# Patient Record
Sex: MALE | Race: WHITE | URBAN - METROPOLITAN AREA
[De-identification: names, ages, dates, MRNs, and addresses within clinical notes are randomized per-mention and may not be internally consistent; named-entity substitution may affect disease eponyms.]

---

## 2019-01-17 ENCOUNTER — OFFICE VISIT (OUTPATIENT)
Dept: URBAN - METROPOLITAN AREA CLINIC 48 | Facility: CLINIC | Age: 79
End: 2019-01-17
Payer: MEDICARE

## 2019-01-17 PROCEDURE — 92014 COMPRE OPH EXAM EST PT 1/>: CPT | Performed by: OPHTHALMOLOGY

## 2019-01-17 PROCEDURE — 92134 CPTRZ OPH DX IMG PST SGM RTA: CPT | Performed by: OPHTHALMOLOGY

## 2019-01-17 PROCEDURE — 76514 ECHO EXAM OF EYE THICKNESS: CPT | Performed by: OPHTHALMOLOGY

## 2019-01-17 RX ORDER — TIMOLOL MALEATE 5 MG/ML
0.5 % SOLUTION OPHTHALMIC
Qty: 1 | Refills: 6 | Status: INACTIVE
Start: 2019-01-17 | End: 2019-04-10

## 2019-01-17 ASSESSMENT — INTRAOCULAR PRESSURE
OD: 12
OS: 14

## 2019-01-17 NOTE — IMPRESSION/PLAN
Impression: Cystoid macular degeneration, left eye: H35.352. 
Patient has had for a long time, has been treated before with Dr. Shanta Perez: Teri Escalante next available

## 2019-03-07 ENCOUNTER — OFFICE VISIT (OUTPATIENT)
Dept: URBAN - METROPOLITAN AREA CLINIC 48 | Facility: CLINIC | Age: 79
End: 2019-03-07
Payer: MEDICARE

## 2019-03-07 DIAGNOSIS — H34.8322 TRIBUTARY (BRANCH) RETINAL VEIN OCCLUSION, LEFT EYE, STABLE: ICD-10-CM

## 2019-03-07 PROCEDURE — 92134 CPTRZ OPH DX IMG PST SGM RTA: CPT | Performed by: OPHTHALMOLOGY

## 2019-03-07 PROCEDURE — 99214 OFFICE O/P EST MOD 30 MIN: CPT | Performed by: OPHTHALMOLOGY

## 2019-03-07 ASSESSMENT — INTRAOCULAR PRESSURE
OS: 14
OD: 12

## 2019-03-07 NOTE — IMPRESSION/PLAN
Impression: Puckering of macula, bilateral: H35.373. Plan: OD: mild ERM; Observe. OS: Hx of PPV, MP in 2013 w/Dr. Elvis Paez. OCT today w/post surgical changes, stable since 2016.  
F/u 1 yr DFE/OCT or sooner PRN

## 2020-10-08 NOTE — IMPRESSION/PLAN
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 Plan: Hx of BRVO OS. No treatment req'd. Will cont to observe. Discussed importance of BP, lipid control. Detail Level: Zone Other (Free Text): Gave epiduo forte apply QD for five days patient will return to clinic in 3 weeks for re-evaluation, if persistent will discuss hyfrecator removal. Note Text (......Xxx Chief Complaint.): This diagnosis correlates with the

## 2021-11-30 ENCOUNTER — OFFICE VISIT (OUTPATIENT)
Dept: URBAN - METROPOLITAN AREA CLINIC 48 | Facility: CLINIC | Age: 81
End: 2021-11-30
Payer: MEDICARE

## 2021-11-30 PROCEDURE — 92134 CPTRZ OPH DX IMG PST SGM RTA: CPT | Performed by: OPHTHALMOLOGY

## 2021-11-30 PROCEDURE — 92014 COMPRE OPH EXAM EST PT 1/>: CPT | Performed by: OPHTHALMOLOGY

## 2021-11-30 RX ORDER — DORZOLAMIDE HCL 20 MG/ML
2 % SOLUTION/ DROPS OPHTHALMIC
Qty: 5 | Refills: 3 | Status: INACTIVE
Start: 2021-11-30 | End: 2022-03-15

## 2021-11-30 ASSESSMENT — INTRAOCULAR PRESSURE
OD: 10
OS: 10

## 2021-11-30 NOTE — IMPRESSION/PLAN
Impression: Cystoid macular degeneration, left eye: H35.352. Patient daughter has history of RP Plan: Discussed diagnosis with patient, may be component of RP. Dorzolamide used for cyst in retina, may be mechanism of RP. Patient to start Dorzolomide tid OS 
continue latanoprost qhs OU
D/C timolol RTC 8-9 weeks DE/OCT Tallula Fass

## 2022-01-25 ENCOUNTER — OFFICE VISIT (OUTPATIENT)
Dept: URBAN - METROPOLITAN AREA CLINIC 48 | Facility: CLINIC | Age: 82
End: 2022-01-25
Payer: MEDICARE

## 2022-01-25 PROCEDURE — 92014 COMPRE OPH EXAM EST PT 1/>: CPT | Performed by: OPHTHALMOLOGY

## 2022-01-25 PROCEDURE — 92134 CPTRZ OPH DX IMG PST SGM RTA: CPT | Performed by: OPHTHALMOLOGY

## 2022-01-25 RX ORDER — NEPAFENAC 3 MG/ML
0.3 % SUSPENSION/ DROPS OPHTHALMIC
Qty: 5 | Refills: 2 | Status: INACTIVE
Start: 2022-01-25 | End: 2022-01-25

## 2022-01-25 ASSESSMENT — INTRAOCULAR PRESSURE
OS: 10
OD: 10

## 2022-01-25 NOTE — IMPRESSION/PLAN
Impression: Primary open-angle glaucoma, left eye, moderate stage: V44.9369. Plan: IOP is stable at this time, continue current regimen. Patient to continue:
 Dorzolamide tid OS  Latanoprost qhs OU

## 2022-01-25 NOTE — IMPRESSION/PLAN
Impression: Puckering of macula, bilateral: H35.373. Plan: ERM will consider consult if vision worsens Continue to monitor

## 2022-01-25 NOTE — IMPRESSION/PLAN
Impression: Cystoid macular degeneration, left eye: H35.352. Patient daughter has history of RP Plan: Discussed diagnosis with patient, may be component of RP. Dorzolamide used for cyst in retina, may be mechanism of RP. Amsler grid screening discussed. If patient notices changes in  Amsler grid, patient to call sooner to be seen. 

 Patient to start Ilevro qd OS


RTC 2 weeks DE/OCT Bob Rios

## 2022-02-23 ENCOUNTER — OFFICE VISIT (OUTPATIENT)
Dept: URBAN - METROPOLITAN AREA CLINIC 48 | Facility: CLINIC | Age: 82
End: 2022-02-23
Payer: MEDICARE

## 2022-02-23 DIAGNOSIS — H35.373 PUCKERING OF MACULA, BILATERAL: ICD-10-CM

## 2022-02-23 DIAGNOSIS — H35.352 CYSTOID MACULAR DEGENERATION, LEFT EYE: ICD-10-CM

## 2022-02-23 PROCEDURE — 92014 COMPRE OPH EXAM EST PT 1/>: CPT | Performed by: OPHTHALMOLOGY

## 2022-02-23 PROCEDURE — 92134 CPTRZ OPH DX IMG PST SGM RTA: CPT | Performed by: OPHTHALMOLOGY

## 2022-02-23 RX ORDER — BRIMONIDINE TARTRATE 2 MG/ML
0.2 % SOLUTION/ DROPS OPHTHALMIC
Qty: 5 | Refills: 1 | Status: INACTIVE
Start: 2022-02-23 | End: 2022-02-23

## 2022-02-23 ASSESSMENT — INTRAOCULAR PRESSURE
OS: 10
OD: 10

## 2022-02-23 NOTE — IMPRESSION/PLAN
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 Patient daughter has history of RP

OCT MAC Findings: 
ERM, cystoid degeneration OD Cystoid degeneration OS Plan: Discussed testing with patient, may be component of RP. Dorzolamide used for cyst in retina, may be mechanism of RP. If patient notices changes in  Amsler grid, patient to call sooner to be seen. OU Dorzolamide BID Nabil BID 
LLevro QD
D/C Latanoprost due to macula edema OS
start Brim BID 
erx sent to pharmacy today

- Hand written note provided for pt. today to consider genetic testing if not done yet. RTC  2 1/2 weeks DE/OCT mac, with RP kit if available at that time or will call when kits come in.

## 2022-02-23 NOTE — IMPRESSION/PLAN
Impression: Primary open-angle glaucoma, left eye, moderate stage: C59.0781. Plan: IOP is stable at this time. OU Dorzolamide BID Nabil BID 
LLevro QD
D/C Latanoprost due to macula edema OS
start Brim BID 
erx sent to pharmacy today

monitor w/plan1

## 2022-03-15 ENCOUNTER — OFFICE VISIT (OUTPATIENT)
Dept: URBAN - METROPOLITAN AREA CLINIC 48 | Facility: CLINIC | Age: 82
End: 2022-03-15
Payer: MEDICARE

## 2022-03-15 DIAGNOSIS — H40.1122 PRIMARY OPEN-ANGLE GLAUCOMA, LEFT EYE, MODERATE STAGE: ICD-10-CM

## 2022-03-15 PROCEDURE — 92134 CPTRZ OPH DX IMG PST SGM RTA: CPT | Performed by: OPHTHALMOLOGY

## 2022-03-15 PROCEDURE — 99214 OFFICE O/P EST MOD 30 MIN: CPT | Performed by: OPHTHALMOLOGY

## 2022-03-15 RX ORDER — DORZOLAMIDE HCL 20 MG/ML
2 % SOLUTION/ DROPS OPHTHALMIC
Qty: 10 | Refills: 4 | Status: INACTIVE
Start: 2022-03-15 | End: 2022-04-05

## 2022-03-15 ASSESSMENT — INTRAOCULAR PRESSURE
OS: 10
OD: 12

## 2022-03-15 NOTE — IMPRESSION/PLAN
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 Patient daughter has history of RP Plan: RP testing pending - waiting on test to come in to do. Continue to practice  Amsler grid testing, patient to call sooner to be seen. Will do trial off Illevro while in town. Suspect this is from low penetrate RP. Continue: Dorzolamide BID OU
d/c Reji Scott [facility] April 4 or 5th Follow Up w/ VF 24-2, Mac OCT and OCT ON, NO DIL

## 2022-03-15 NOTE — IMPRESSION/PLAN
Impression: Primary open-angle glaucoma, left eye, moderate stage: B66.0626. Plan: IOP within excellent range OU. Some concern for OS RNFL. 
see plan 1

## 2022-04-05 ENCOUNTER — OFFICE VISIT (OUTPATIENT)
Dept: URBAN - METROPOLITAN AREA CLINIC 48 | Facility: CLINIC | Age: 82
End: 2022-04-05
Payer: MEDICARE

## 2022-04-05 DIAGNOSIS — H35.353 CYSTOID MACULAR DEGENERATION, BILATERAL: Primary | ICD-10-CM

## 2022-04-05 PROCEDURE — 92012 INTRM OPH EXAM EST PATIENT: CPT | Performed by: OPHTHALMOLOGY

## 2022-04-05 PROCEDURE — 92134 CPTRZ OPH DX IMG PST SGM RTA: CPT | Performed by: OPHTHALMOLOGY

## 2022-04-05 RX ORDER — DORZOLAMIDE HCL 20 MG/ML
2 % SOLUTION/ DROPS OPHTHALMIC
Qty: 10 | Refills: 4 | Status: ACTIVE
Start: 2022-04-05

## 2022-04-05 ASSESSMENT — INTRAOCULAR PRESSURE
OD: 12
OS: 12

## 2022-04-05 NOTE — IMPRESSION/PLAN
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 Patient daughter has history of RP Plan: stable ME, suspect low penitrate of RP Continue: Dorzolamide BID OU

genetic testing done in clinic today RTC Fall 2022 DFE /OCT mac Dr. Sherman Estevez to call patient with Genetic testing result

## 2022-12-13 ENCOUNTER — OFFICE VISIT (OUTPATIENT)
Dept: URBAN - METROPOLITAN AREA CLINIC 48 | Facility: CLINIC | Age: 82
End: 2022-12-13
Payer: MEDICARE

## 2022-12-13 PROCEDURE — 92133 CPTRZD OPH DX IMG PST SGM ON: CPT | Performed by: OPHTHALMOLOGY

## 2022-12-13 PROCEDURE — 92134 CPTRZ OPH DX IMG PST SGM RTA: CPT | Performed by: OPHTHALMOLOGY

## 2022-12-13 PROCEDURE — 99214 OFFICE O/P EST MOD 30 MIN: CPT | Performed by: OPHTHALMOLOGY

## 2022-12-13 RX ORDER — DORZOLAMIDE HCL 20 MG/ML
2 % SOLUTION/ DROPS OPHTHALMIC
Qty: 10 | Refills: 3 | Status: ACTIVE
Start: 2022-12-13

## 2022-12-13 ASSESSMENT — INTRAOCULAR PRESSURE
OS: 14
OD: 14

## 2022-12-13 NOTE — IMPRESSION/PLAN
Impression: Open angle with borderline findings, high risk, left eye: H40.022. Plan: IOP within good range OU. RNFL done today: NO evidence of progression Suspect visual field loss and RNFL thinning is due to RP. Use: Dorzolamide TID OU

RTC 1yr IOP check, VF, RNFL

## 2022-12-13 NOTE — IMPRESSION/PLAN
Impression: Retinitis pigmentosa: H35.52. Plan: Recommend evaluation with Dr. Ej Humphreys or Dr. Holland Stable with RCA High Suspicion of Retinitis Pigmentosa, w/ worsening subjective VF and worsening ME To be seen within 1 mo - RP Evaluation
may need testing.

## 2022-12-14 ENCOUNTER — OFFICE VISIT (OUTPATIENT)
Dept: URBAN - METROPOLITAN AREA CLINIC 48 | Facility: CLINIC | Age: 82
End: 2022-12-14
Payer: MEDICARE

## 2022-12-14 DIAGNOSIS — H35.353 CYSTOID MACULAR DEGENERATION, BILATERAL: ICD-10-CM

## 2022-12-14 DIAGNOSIS — H40.022 OPEN ANGLE WITH BORDERLINE FINDINGS, HIGH RISK, LEFT EYE: ICD-10-CM

## 2022-12-14 DIAGNOSIS — H35.52 RETINITIS PIGMENTOSA: Primary | ICD-10-CM

## 2022-12-14 PROCEDURE — 99214 OFFICE O/P EST MOD 30 MIN: CPT | Performed by: OPHTHALMOLOGY

## 2022-12-14 PROCEDURE — 92134 CPTRZ OPH DX IMG PST SGM RTA: CPT | Performed by: OPHTHALMOLOGY

## 2022-12-14 ASSESSMENT — INTRAOCULAR PRESSURE
OD: 12
OS: 15

## 2022-12-14 NOTE — IMPRESSION/PLAN
Impression: Open angle with borderline findings, high risk, left eye: H40.022.  Plan: Follow up with Dr Deanne Hendrix

## 2022-12-14 NOTE — IMPRESSION/PLAN
Impression: Retinitis pigmentosa: H35.52.
-+ FH in daughter (DNA testing confirmed) OCT:
OD: CME
OS: Mild CME Plan: Natural history, treatment options and prognosis of RP discussed. Pt with daughter who has genotype confirmed RP. Given this, I do not think genetic testing is necessary at this time. Would consider once more genetic therapies are available. Has CME OD>OS. Rec starting Dorzolamide TID OU.  

RTC 2 months DFE OU OCT OU

## 2023-03-20 ENCOUNTER — OFFICE VISIT (OUTPATIENT)
Facility: LOCATION | Age: 83
End: 2023-03-20
Payer: MEDICARE

## 2023-03-20 DIAGNOSIS — H35.52 PIGMENTARY RETINAL DYSTROPHY: Primary | ICD-10-CM

## 2023-03-20 DIAGNOSIS — H40.022 OPEN ANGLE WITH BORDERLINE FINDINGS, HIGH RISK, LEFT EYE: ICD-10-CM

## 2023-03-20 PROCEDURE — 92134 CPTRZ OPH DX IMG PST SGM RTA: CPT | Performed by: OPHTHALMOLOGY

## 2023-03-20 PROCEDURE — 99213 OFFICE O/P EST LOW 20 MIN: CPT | Performed by: OPHTHALMOLOGY

## 2023-03-20 ASSESSMENT — INTRAOCULAR PRESSURE
OD: 13
OS: 14

## 2023-03-20 NOTE — IMPRESSION/PLAN
Impression: Retinitis pigmentosa: H35.52.
-+ FH in daughter (DNA testing confirmed) OCT: 03/20/2023 OD: CME
OS: Mild CME Plan: Natural history, treatment options and prognosis of RP discussed. Pt with daughter who has genotype confirmed RP. Given this, I do not think genetic testing is necessary at this time. Would consider once more genetic therapies are available. Has CME OD>OS. So significant response to Dorzolamide. Discussed with patient options of obs vs continuing drops. Patient prefers to stop drops.  

RTC 6 months DFE OU OCT OU PAIN AT 30 DEGREES

## 2023-03-20 NOTE — IMPRESSION/PLAN
Impression: Open angle with borderline findings, high risk, left eye: H40.022.  Plan: Follow up with Dr Maggie Osuna

## 2024-01-22 ENCOUNTER — OFFICE VISIT (OUTPATIENT)
Facility: LOCATION | Age: 84
End: 2024-01-22
Payer: MEDICARE

## 2024-01-22 DIAGNOSIS — H40.022 OPEN ANGLE WITH BORDERLINE FINDINGS, HIGH RISK, LEFT EYE: ICD-10-CM

## 2024-01-22 DIAGNOSIS — H35.52 PIGMENTARY RETINAL DYSTROPHY: Primary | ICD-10-CM

## 2024-01-22 PROCEDURE — 92134 CPTRZ OPH DX IMG PST SGM RTA: CPT | Performed by: OPHTHALMOLOGY

## 2024-01-22 PROCEDURE — 99213 OFFICE O/P EST LOW 20 MIN: CPT | Performed by: OPHTHALMOLOGY

## 2024-01-22 ASSESSMENT — INTRAOCULAR PRESSURE
OS: 17
OD: 13